# Patient Record
Sex: FEMALE | Race: WHITE | NOT HISPANIC OR LATINO
[De-identification: names, ages, dates, MRNs, and addresses within clinical notes are randomized per-mention and may not be internally consistent; named-entity substitution may affect disease eponyms.]

---

## 2017-07-07 ENCOUNTER — APPOINTMENT (OUTPATIENT)
Dept: ORTHOPEDIC SURGERY | Facility: CLINIC | Age: 57
End: 2017-07-07

## 2017-07-07 VITALS — WEIGHT: 164 LBS | RESPIRATION RATE: 16 BRPM | BODY MASS INDEX: 27.32 KG/M2 | HEIGHT: 64.96 IN

## 2017-07-07 DIAGNOSIS — M72.0 PALMAR FASCIAL FIBROMATOSIS [DUPUYTREN]: ICD-10-CM

## 2017-07-07 DIAGNOSIS — E78.00 PURE HYPERCHOLESTEROLEMIA, UNSPECIFIED: ICD-10-CM

## 2017-07-07 DIAGNOSIS — Z86.59 PERSONAL HISTORY OF OTHER MENTAL AND BEHAVIORAL DISORDERS: ICD-10-CM

## 2017-07-07 DIAGNOSIS — G47.00 INSOMNIA, UNSPECIFIED: ICD-10-CM

## 2017-07-07 RX ORDER — ATORVASTATIN CALCIUM 80 MG/1
TABLET, FILM COATED ORAL
Refills: 0 | Status: ACTIVE | COMMUNITY

## 2017-07-07 RX ORDER — BUPROPION HYDROCHLORIDE 200 MG/1
200 TABLET, FILM COATED ORAL
Refills: 0 | Status: ACTIVE | COMMUNITY

## 2017-07-07 RX ORDER — CITALOPRAM 40 MG/1
40 TABLET, FILM COATED ORAL
Refills: 0 | Status: ACTIVE | COMMUNITY

## 2023-02-07 ENCOUNTER — NON-APPOINTMENT (OUTPATIENT)
Age: 63
End: 2023-02-07

## 2023-02-16 ENCOUNTER — APPOINTMENT (OUTPATIENT)
Dept: UROLOGY | Facility: CLINIC | Age: 63
End: 2023-02-16
Payer: COMMERCIAL

## 2023-02-16 VITALS
TEMPERATURE: 97.7 F | SYSTOLIC BLOOD PRESSURE: 114 MMHG | DIASTOLIC BLOOD PRESSURE: 74 MMHG | WEIGHT: 180 LBS | BODY MASS INDEX: 29.99 KG/M2 | HEART RATE: 83 BPM | HEIGHT: 65 IN | OXYGEN SATURATION: 96 %

## 2023-02-16 PROCEDURE — 81003 URINALYSIS AUTO W/O SCOPE: CPT | Mod: QW

## 2023-02-16 PROCEDURE — 76775 US EXAM ABDO BACK WALL LIM: CPT

## 2023-02-16 PROCEDURE — 99204 OFFICE O/P NEW MOD 45 MIN: CPT

## 2023-02-16 NOTE — ASSESSMENT
[FreeTextEntry1] : Findings today consistent with a passed right ureteral calculus and a likely residual calculus in the right kidney.  I recommended that she continue to drink appropriate amount of water.  Blood was also drawn for a CMP today.  CT results were requested from [facility] today.  She was given a requisition for 24-hour urine.  She will return next week to review these results and we will review any results not available before her return to [place] by subsequent email correspondence.  She is scheduled to return to [place] beginning of March. Celeste Gasca MD\par

## 2023-02-16 NOTE — PHYSICAL EXAM
[General Appearance - Well Developed] : well developed [General Appearance - Well Nourished] : well nourished [Normal Appearance] : normal appearance [Well Groomed] : well groomed [General Appearance - In No Acute Distress] : no acute distress [Edema] : no peripheral edema [Respiration, Rhythm And Depth] : normal respiratory rhythm and effort [Exaggerated Use Of Accessory Muscles For Inspiration] : no accessory muscle use [Abdomen Soft] : soft [Abdomen Tenderness] : non-tender [Abdomen Hernia] : no hernia was discovered [Costovertebral Angle Tenderness] : no ~M costovertebral angle tenderness [Normal Station and Gait] : the gait and station were normal for the patient's age [] : no rash [No Focal Deficits] : no focal deficits [Oriented To Time, Place, And Person] : oriented to person, place, and time [Affect] : the affect was normal [Mood] : the mood was normal [Not Anxious] : not anxious

## 2023-02-16 NOTE — HISTORY OF PRESENT ILLNESS
[FreeTextEntry1] : 61 YO F seen TODAY 2/16/2023 as NPT For kidney stone.  This patient developed severe right flank pain 2 weeks ago with nausea and vomiting while visiting her daughter's in [place].  She resides in [facility].  She went to [place] emergency room had a CT scan which demonstrated a stone in the right ureter.  There was also slight dilatation of the right ureter noted.  She subsequently passed the stone and is symptom-free at this time although her urine demonstrates a trace of white blood cells.  This is her first stone she denies any prior gross hematuria.\par UA trace WBC\par \par Renal US:\par Right kidney: 9.8 cm x 4.8 cm x 3.6 cm\par Cortical Thickness: 1.1 cm UP 1.2 cm LP \par Left kidney: 11.5 cm x 4.7 cm x 4.6 cm\par Cortical Thickness: 1.4 cm UP 1.2 cm LP \par Echogenicity: Normal\par Bladder: PVR: 15 cc, Bilateral ureteral jets visualized. \par \par Impressions: \par There is an echogenic focus in the right mid pole measuring 2.8 mm with posterior shadowing and twinkle artifact. \par The left kidney is unremarkable. \par Both kidneys are normal in size and echogenicity without hydronephrosis or solid masses present.\par [facility] 02/16/2023\par Assessment\par Renal calculus, right (592.0) (N20.0)\par \par Patient also takes Wellbutrin and Celexa for anxiety and depression, Livalo for cholesterol vitamin D for osteoporosis.  She is also on omeprazole for gastritis and esophagitis secondary to the vomiting from her stone she had a recent gastroscopy performed.

## 2023-02-16 NOTE — LETTER BODY
[Dear  ___] : Dear ~NIYAH, [Consult Letter:] : I had the pleasure of evaluating your patient, [unfilled]. [Please see my note below.] : Please see my note below. [Consult Closing:] : Thank you very much for allowing me to participate in the care of this patient.  If you have any questions, please do not hesitate to contact me. [FreeTextEntry2] : Florin Hart MD [FreeTextEntry3] : Best Regards, \par \par Celeste Gasca MD\par

## 2023-02-17 ENCOUNTER — NON-APPOINTMENT (OUTPATIENT)
Age: 63
End: 2023-02-17

## 2023-02-17 LAB
ALBUMIN SERPL ELPH-MCNC: 4.1 G/DL
ALP BLD-CCNC: 67 U/L
ALT SERPL-CCNC: 12 U/L
ANION GAP SERPL CALC-SCNC: 14 MMOL/L
AST SERPL-CCNC: 13 U/L
BILIRUB SERPL-MCNC: 0.3 MG/DL
BILIRUB UR QL STRIP: NORMAL
BUN SERPL-MCNC: 16 MG/DL
CALCIUM SERPL-MCNC: 9.5 MG/DL
CHLORIDE SERPL-SCNC: 106 MMOL/L
CLARITY UR: CLEAR
CO2 SERPL-SCNC: 22 MMOL/L
COLLECTION METHOD: NORMAL
CREAT SERPL-MCNC: 0.93 MG/DL
EGFR: 69 ML/MIN/1.73M2
GLUCOSE SERPL-MCNC: 100 MG/DL
GLUCOSE UR-MCNC: NORMAL
HCG UR QL: 0.2 EU/DL
HGB UR QL STRIP.AUTO: NORMAL
KETONES UR-MCNC: NORMAL
LEUKOCYTE ESTERASE UR QL STRIP: NORMAL
NITRITE UR QL STRIP: NORMAL
PH UR STRIP: 5
POTASSIUM SERPL-SCNC: 4.3 MMOL/L
PROT SERPL-MCNC: 6.9 G/DL
PROT UR STRIP-MCNC: NORMAL
SODIUM SERPL-SCNC: 143 MMOL/L
SP GR UR STRIP: 1.02

## 2023-02-21 ENCOUNTER — NON-APPOINTMENT (OUTPATIENT)
Age: 63
End: 2023-02-21

## 2023-02-21 LAB — BACTERIA UR CULT: NORMAL

## 2023-02-22 LAB — NIDUS STONE QN: NORMAL

## 2023-02-24 ENCOUNTER — APPOINTMENT (OUTPATIENT)
Dept: UROLOGY | Facility: CLINIC | Age: 63
End: 2023-02-24
Payer: COMMERCIAL

## 2023-02-24 DIAGNOSIS — N20.0 CALCULUS OF KIDNEY: ICD-10-CM

## 2023-02-24 LAB
BILIRUB UR QL STRIP: NORMAL
CLARITY UR: CLEAR
COLLECTION METHOD: NORMAL
GLUCOSE UR-MCNC: NORMAL
HCG UR QL: 0.2 EU/DL
HGB UR QL STRIP.AUTO: NORMAL
KETONES UR-MCNC: NORMAL
LEUKOCYTE ESTERASE UR QL STRIP: NORMAL
NITRITE UR QL STRIP: NORMAL
PH UR STRIP: 5.5
PROT UR STRIP-MCNC: NORMAL
SP GR UR STRIP: >+1.03

## 2023-02-24 PROCEDURE — 81003 URINALYSIS AUTO W/O SCOPE: CPT | Mod: QW

## 2023-02-24 PROCEDURE — 99213 OFFICE O/P EST LOW 20 MIN: CPT

## 2023-02-24 NOTE — HISTORY OF PRESENT ILLNESS
[FreeTextEntry1] : 61 YO F seen TODAY 2/16/2023 as NPT For kidney stone.  This patient developed severe right flank pain 2 weeks ago with nausea and vomiting while visiting her daughter's in [place].  She resides in [facility].  She went to [place] emergency room had a CT scan which demonstrated a stone in the right ureter.  There was also slight dilatation of the right ureter noted.  She subsequently passed the stone and is symptom-free at this time although her urine demonstrates a trace of white blood cells.  This is her first stone she denies any prior gross hematuria.\par UA trace WBC\par \par Renal US:\par Right kidney: 9.8 cm x 4.8 cm x 3.6 cm\par Cortical Thickness: 1.1 cm UP 1.2 cm LP \par Left kidney: 11.5 cm x 4.7 cm x 4.6 cm\par Cortical Thickness: 1.4 cm UP 1.2 cm LP \par Echogenicity: Normal\par Bladder: PVR: 15 cc, Bilateral ureteral jets visualized. \par \par Impressions: \par There is an echogenic focus in the right mid pole measuring 2.8 mm with posterior shadowing and twinkle artifact. \par The left kidney is unremarkable. \par Both kidneys are normal in size and echogenicity without hydronephrosis or solid masses present.\par [facility] 02/16/2023\par Assessment\par Renal calculus, right (592.0) (N20.0)\par Findings today consistent with a passed right ureteral calculus and a likely residual calculus in the right kidney. I recommended that she continue to drink appropriate amount of water. Blood was also drawn for a CMP today. CT results were requested from [facility] today. She was given a requisition for 24-hour urine. She will return next week to review these results and we will review any results not available before her return to [place] by subsequent email correspondence. She is scheduled to return to [place] beginning of March. \par CMP: Ca++ 9.5, BUN/CRE normal,  non-fasting.\par \par Patient seen TODAY to review above results. She is asymptomatic. She sent in the 24 hour urine test earlier this week and will be returning to [place] on 3/1/2023. \par Stone analysis from last visit: 90% CaOX MONO, 10% CaOX DI, reviewed with patient via email after she left.\par UA moderate WBC\par \par \par Patient also takes Wellbutrin and Celexa for anxiety and depression, Livalo for cholesterol vitamin D for osteoporosis.  She is also on omeprazole for gastritis and esophagitis secondary to the vomiting from her stone she had a recent gastroscopy performed.

## 2023-02-24 NOTE — ASSESSMENT
[FreeTextEntry1] : We discussed her blood work which showed a normal Ca++ level, borderline GLU in a non-fasting specimen, CaOX stone,  and normal renal function.  I recommended that she continue to drink a large quantity of urine and we will await the 24 hour urine test results to define other stone risk factors. We will converse in the future via email, for which she supplied her email address: [email or web address]  .  We will also send urine for C+S in view of the UA results from today. Celeste Gasca MD\par \par

## 2023-02-24 NOTE — LETTER BODY
[Dear  ___] : Dear  [unfilled], [Courtesy Letter:] : I had the pleasure of seeing your patient, [unfilled], in my office today. [Please see my note below.] : Please see my note below. [Consult Closing:] : Thank you very much for allowing me to participate in the care of this patient.  If you have any questions, please do not hesitate to contact me. [FreeTextEntry3] : Best Regards, \par \par Celeste Gasca MD\par

## 2023-03-03 LAB — BACTERIA UR CULT: NORMAL
